# Patient Record
Sex: MALE | Race: WHITE
[De-identification: names, ages, dates, MRNs, and addresses within clinical notes are randomized per-mention and may not be internally consistent; named-entity substitution may affect disease eponyms.]

---

## 2017-06-02 ENCOUNTER — HOSPITAL ENCOUNTER (EMERGENCY)
Dept: HOSPITAL 26 - MED | Age: 27
Discharge: HOME | End: 2017-06-02
Payer: SELF-PAY

## 2017-06-02 VITALS — WEIGHT: 180 LBS | BODY MASS INDEX: 28.93 KG/M2 | HEIGHT: 66 IN

## 2017-06-02 VITALS — SYSTOLIC BLOOD PRESSURE: 119 MMHG | DIASTOLIC BLOOD PRESSURE: 75 MMHG

## 2017-06-02 VITALS — DIASTOLIC BLOOD PRESSURE: 80 MMHG | SYSTOLIC BLOOD PRESSURE: 118 MMHG

## 2017-06-02 DIAGNOSIS — R06.02: ICD-10-CM

## 2017-06-02 DIAGNOSIS — R07.89: Primary | ICD-10-CM

## 2017-06-02 PROCEDURE — 71010: CPT

## 2017-06-02 PROCEDURE — 99283 EMERGENCY DEPT VISIT LOW MDM: CPT

## 2017-06-02 PROCEDURE — 96372 THER/PROPH/DIAG INJ SC/IM: CPT

## 2017-06-02 NOTE — NUR
Patient discharged with v/s stable. Written and verbal after care instructions 
given and explained. 

Patient alert, oriented and verbalized understanding of instructions. 
Ambulatory with steady gait. All questions addressed prior to discharge. ID 
band removed. Patient advised to follow up with PMD. Rx of lidocain patch and 
naprosyn given. Patient educated on indication of medication including possible 
reaction and side effects. Opportunity to ask questions provided and answered.

## 2017-06-02 NOTE — NUR
PATIENT PRESENTS TO ED WITH c/o pain on right upper side of body x 2 days.  pt 
admits to smoking marijuana 2 days ago and crystal meth 2 weeks ago. 

PT DENIES N/V/D; SKIN IS PINK/WARM/DRY; AAOX4 WITH EVEN AND STEADY GAIT; LUNGS 
CLEAR BL; HR EVEN AND REGULAR; PT DENIES ANY FEVER, CP, SOB, OR COUGH AT THIS 
TIME; PATIENT STATES PAIN OF 10/10 AT THIS TIME; VSS; PATIENT POSITIONED FOR 
COMFORT; HOB ELEVATED; BEDRAILS UP X2; BED DOWN. ER MD MADE AWARE OF PT STATUS.

## 2019-02-16 ENCOUNTER — HOSPITAL ENCOUNTER (EMERGENCY)
Dept: HOSPITAL 26 - MED | Age: 29
Discharge: ANOTHER HOSPITAL | End: 2019-02-16
Payer: SELF-PAY

## 2019-02-16 VITALS — WEIGHT: 172.12 LBS | BODY MASS INDEX: 27.66 KG/M2 | HEIGHT: 66 IN

## 2019-02-16 VITALS — SYSTOLIC BLOOD PRESSURE: 128 MMHG | DIASTOLIC BLOOD PRESSURE: 72 MMHG

## 2019-02-16 VITALS — DIASTOLIC BLOOD PRESSURE: 55 MMHG | SYSTOLIC BLOOD PRESSURE: 131 MMHG

## 2019-02-16 DIAGNOSIS — S62.611A: Primary | ICD-10-CM

## 2019-02-16 DIAGNOSIS — S61.241A: ICD-10-CM
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
## 2019-02-16 NOTE — NUR
Patient to be transferred to [facility] ER.  Is being transferred 
due to SPECIALIST.  Receiving facility has accepting physician and available 
space. ER physician has signed transfer form.  Patient or responsible party has 
agreed to transfer and signed form.  Patient belongings inventoried and will be 
sent with patient.  Copy of nursing notes, lab reports, EKG, Physicians Orders 
and X-rays to be sent with patient.  Report called to KELTON GEE at receiving 
facility.  ETA IS 40 MIN. 

PT ARRIVING BY PRIVATE AUTO.
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
## 2019-02-16 NOTE — NUR
DR. BACON AT BEDSIDE EXPLAINING ER TO ER TRANSFER AT THIS TIME, ACCEPTING 
PHYSICIAN DR. ARDON, PT AGREED TO TRANSFER BY PRIVATE AUTO